# Patient Record
Sex: MALE | Race: BLACK OR AFRICAN AMERICAN | NOT HISPANIC OR LATINO
[De-identification: names, ages, dates, MRNs, and addresses within clinical notes are randomized per-mention and may not be internally consistent; named-entity substitution may affect disease eponyms.]

---

## 2023-08-11 ENCOUNTER — APPOINTMENT (OUTPATIENT)
Dept: ENDOCRINOLOGY | Facility: CLINIC | Age: 18
End: 2023-08-11
Payer: COMMERCIAL

## 2023-08-11 VITALS
WEIGHT: 164 LBS | HEART RATE: 82 BPM | OXYGEN SATURATION: 98 % | BODY MASS INDEX: 27.66 KG/M2 | HEIGHT: 64.69 IN | SYSTOLIC BLOOD PRESSURE: 100 MMHG | DIASTOLIC BLOOD PRESSURE: 70 MMHG

## 2023-08-11 DIAGNOSIS — E05.90 THYROTOXICOSIS, UNSPECIFIED W/OUT THYROTOXIC CRISIS OR STORM: ICD-10-CM

## 2023-08-11 DIAGNOSIS — E55.9 VITAMIN D DEFICIENCY, UNSPECIFIED: ICD-10-CM

## 2023-08-11 PROCEDURE — 99204 OFFICE O/P NEW MOD 45 MIN: CPT

## 2023-08-11 NOTE — REVIEW OF SYSTEMS
[TextEntry] : REVIEW OF SYSTEMS: General: No fatigue, no weight gain, no weight loss HEENT: No headaches, no hearing loss Respiratory: No cough, no shortness of breath  Cardiovascular: No chest pain, no palpitations, no leg swelling  Gastrointestinal: No nausea, no vomiting, no constipation, no diarrhea  Integumentary: No rash, no skin/hair thinning  Musculoskeletal: No muscle aches, no joint pain Neurologic: No tremors, no syncopal episodes Psychiatric: The patient is not currently nervous or anxious  Endocrine: No thyroid/neck swelling, no polydipsia, no heat/cold intolerance  Genitourinary: No polyuria, no dysuria  The review of systems is otherwise negative except as noted in HPI.

## 2023-08-11 NOTE — PHYSICAL EXAM
[TextEntry] : PHYSICAL EXAMINATION: Vital signs from today's encounter reviewed.  GENERAL: No acute distress, clinically eukinetic, normal appearance HEAD: Normocephalic, atraumatic EYES: conjunctivae are pink and moist, no icterus, no proptosis  NECK: thyroid is not enlarged/nodular on palpation, non-tender, no adenopathy CARDIOVASCULAR: well-perfused extremities, no peripheral edema RESPIRATORY: normal chest expansion with good pulmonary effort, no acute respiratory distress MUSCULOSKELETAL: no swelling, normal range of motion, normal gait SKIN: no pallor, no icterus, no rash  NEUROLOGIC: alert and oriented, no evident focal deficits, no tremors  PSYCHIATRIC: mood and affect are normal ENDOCRINE: No obvious stigmata of Cushing's or acromegaly present

## 2023-08-11 NOTE — ASSESSMENT
[FreeTextEntry1] : 1. Hyperthyroidism  Labs from 5/2022 with mild hyperthyroidism.  Clinically euthyroid.  No family history of Graves' disease.  Normal neck exam. – Check TSH, free T4, total T3 – Check TSI and TRAb – We discussed symptoms of hyperthyroidism, he will alert us if any new symptoms  2. Vitamin D deficiency  25 OH vitamin D14.1 From 5/2022, did not take any supplements. – Check 25 OH vitamin D, will start supplements accordingly  Santino Contreras MD [facility] at [address], [address] Ph: [phone] Fax: [phone]

## 2023-08-11 NOTE — HISTORY OF PRESENT ILLNESS
[FreeTextEntry1] : CHIEF COMPLAINT: Hyperthyroidism REFERRED BY: PCP Dr. Nicol Teresa, pediatrics  HISTORY OF PRESENTING ILLNESS: The patient is a 18-year-old male being seen in the office today for evaluation of hyperthyroidism.  Also has vitamin D deficiency.  He denies having any other medical history. Outside labs reviewed from PCP, see scanned. 5/7/2022: TSH 0.257 (0.45-4.5), free T4 1.68 (0.93-1.6), 25 OH vitamin D 14.1 (he is not taking any vitamin D supplements)  He denies any active symptoms. No constipation or diarrhea. No weight gain or weight loss. No heat or cold intolerance. No fatigue. No palpitations. No tremors. No anxiety or depression. No skin or hair changes. No facial or peripheral edema.  Compressive symptoms: No neck swelling, no dysphagia, no dyspnea, no change in voice. Eye symptoms: No proptosis or eye swelling. No stare, no lid lag.   No family history of thyroid disease or thyroid cancer. No history of biotin intake.  No personal history of radiation exposure in the head and neck area.  No known history of thyroid nodules.  No prior history of taking any thyroid medications.  Social history: Denies alcohol use, denies smoking or drug use.  Sexually active without any issues.

## 2023-08-13 LAB
25(OH)D3 SERPL-MCNC: 21.2 NG/ML
T3 SERPL-MCNC: 114 NG/DL
T4 FREE SERPL-MCNC: 1.3 NG/DL
TSH SERPL-ACNC: 0.87 UIU/ML

## 2023-08-14 LAB
TSH RECEPTOR AB: <1.1 IU/L
TSI ACT/NOR SER: <0.1 IU/L

## 2024-02-05 ENCOUNTER — APPOINTMENT (OUTPATIENT)
Dept: ENDOCRINOLOGY | Facility: CLINIC | Age: 19
End: 2024-02-05